# Patient Record
Sex: MALE | Race: WHITE | Employment: FULL TIME | URBAN - METROPOLITAN AREA
[De-identification: names, ages, dates, MRNs, and addresses within clinical notes are randomized per-mention and may not be internally consistent; named-entity substitution may affect disease eponyms.]

---

## 2019-04-03 ENCOUNTER — HOSPITAL ENCOUNTER (EMERGENCY)
Age: 34
Discharge: HOME | End: 2019-04-03
Attending: EMERGENCY MEDICINE
Payer: COMMERCIAL

## 2019-04-03 VITALS
DIASTOLIC BLOOD PRESSURE: 82 MMHG | TEMPERATURE: 98 F | HEART RATE: 68 BPM | SYSTOLIC BLOOD PRESSURE: 114 MMHG | BODY MASS INDEX: 36.37 KG/M2 | WEIGHT: 240 LBS | OXYGEN SATURATION: 100 % | HEIGHT: 68 IN | RESPIRATION RATE: 16 BRPM

## 2019-04-03 DIAGNOSIS — R35.0 URINARY FREQUENCY: Primary | ICD-10-CM

## 2019-04-03 LAB
ALBUMIN SERPL-MCNC: 5.1 G/DL (ref 3.5–4.6)
ALP BLD-CCNC: 80 U/L (ref 35–104)
ALT SERPL-CCNC: 28 U/L (ref 0–41)
ANION GAP SERPL CALCULATED.3IONS-SCNC: 14 MEQ/L (ref 9–15)
AST SERPL-CCNC: 26 U/L (ref 0–40)
BASOPHILS ABSOLUTE: 0 K/UL (ref 0–0.2)
BASOPHILS RELATIVE PERCENT: 0.6 %
BILIRUB SERPL-MCNC: 0.6 MG/DL (ref 0.2–0.7)
BILIRUBIN URINE: NEGATIVE
BLOOD, URINE: NEGATIVE
BUN BLDV-MCNC: 7 MG/DL (ref 6–20)
CALCIUM SERPL-MCNC: 9.3 MG/DL (ref 8.5–9.9)
CHLORIDE BLD-SCNC: 96 MEQ/L (ref 95–107)
CLARITY: CLEAR
CO2: 30 MEQ/L (ref 20–31)
COLOR: YELLOW
CREAT SERPL-MCNC: 0.79 MG/DL (ref 0.7–1.2)
EOSINOPHILS ABSOLUTE: 0.1 K/UL (ref 0–0.7)
EOSINOPHILS RELATIVE PERCENT: 1.5 %
GFR AFRICAN AMERICAN: >60
GFR NON-AFRICAN AMERICAN: >60
GLOBULIN: 2.8 G/DL (ref 2.3–3.5)
GLUCOSE BLD-MCNC: 107 MG/DL (ref 70–99)
GLUCOSE BLD-MCNC: 86 MG/DL (ref 60–115)
GLUCOSE URINE: NEGATIVE MG/DL
HCT VFR BLD CALC: 42.2 % (ref 42–52)
HEMOGLOBIN: 15.4 G/DL (ref 14–18)
KETONES, URINE: NEGATIVE MG/DL
LEUKOCYTE ESTERASE, URINE: NEGATIVE
LYMPHOCYTES ABSOLUTE: 2.2 K/UL (ref 1–4.8)
LYMPHOCYTES RELATIVE PERCENT: 30 %
MCH RBC QN AUTO: 32.2 PG (ref 27–31.3)
MCHC RBC AUTO-ENTMCNC: 36.5 % (ref 33–37)
MCV RBC AUTO: 88.2 FL (ref 80–100)
MONOCYTES ABSOLUTE: 0.5 K/UL (ref 0.2–0.8)
MONOCYTES RELATIVE PERCENT: 6.7 %
NEUTROPHILS ABSOLUTE: 4.5 K/UL (ref 1.4–6.5)
NEUTROPHILS RELATIVE PERCENT: 61.2 %
NITRITE, URINE: NEGATIVE
PDW BLD-RTO: 13.1 % (ref 11.5–14.5)
PERFORMED ON: NORMAL
PH UA: 5.5 (ref 5–9)
PLATELET # BLD: 268 K/UL (ref 130–400)
POTASSIUM SERPL-SCNC: 3.9 MEQ/L (ref 3.4–4.9)
PROTEIN UA: NEGATIVE MG/DL
RBC # BLD: 4.78 M/UL (ref 4.7–6.1)
SODIUM BLD-SCNC: 140 MEQ/L (ref 135–144)
SPECIFIC GRAVITY UA: 1.02 (ref 1–1.03)
TOTAL PROTEIN: 7.9 G/DL (ref 6.3–8)
URINE REFLEX TO CULTURE: NORMAL
UROBILINOGEN, URINE: 0.2 E.U./DL
WBC # BLD: 7.3 K/UL (ref 4.8–10.8)

## 2019-04-03 PROCEDURE — 85025 COMPLETE CBC W/AUTO DIFF WBC: CPT

## 2019-04-03 PROCEDURE — 36415 COLL VENOUS BLD VENIPUNCTURE: CPT

## 2019-04-03 PROCEDURE — 81003 URINALYSIS AUTO W/O SCOPE: CPT

## 2019-04-03 PROCEDURE — 80053 COMPREHEN METABOLIC PANEL: CPT

## 2019-04-03 PROCEDURE — 99283 EMERGENCY DEPT VISIT LOW MDM: CPT

## 2019-04-03 SDOH — HEALTH STABILITY: MENTAL HEALTH: HOW OFTEN DO YOU HAVE A DRINK CONTAINING ALCOHOL?: NEVER

## 2019-04-03 ASSESSMENT — ENCOUNTER SYMPTOMS
WHEEZING: 0
ALLERGIC/IMMUNOLOGIC NEGATIVE: 1
ABDOMINAL PAIN: 0
VOMITING: 0
NAUSEA: 0
EYES NEGATIVE: 1
SHORTNESS OF BREATH: 0

## 2019-04-03 NOTE — PROGRESS NOTES
Patient is alert and oriented X 4. Patient states that for the last two months he has been experienced dizziness with increased frequency. Patient denies nausea, vomiting, diarrhea.  Accu check completed 86

## 2019-04-03 NOTE — ED PROVIDER NOTES
[address] [facility] ED  eMERGENCY dEPARTMENT eNCOUnter      Pt Name: Ricardo Mejía  MRN: [id number]  [place] 1985  Date of evaluation: 4/3/2019  Provider: Narayan Serra MD    CHIEF COMPLAINT       Chief Complaint   Patient presents with    Urinary Frequency     FREQUENT URGE TO URINATE X2 MONTHS DIZZY DENIES ANY N/V/D FEVER OR CHILLS AT THIS TIME         HISTORY OF PRESENT ILLNESS   (Location/Symptom, Timing/Onset,Context/Setting, Quality, Duration, Modifying Factors, Severity)  Note limiting factors. Ricardo Mejía is a 34 y.o. male who presents to the emergency department complaining of urinary frequency. Patient admits that the pregnancy seems to start over last 2 months. Patient notes that perhaps she has occasional flank discomfort. Patient admits that occasionally he has some chills. Patient denies testicular pain or penile discharge or other unusual symptoms. Patient denies nausea vomiting, fevers or chills. Patient admits she is very fearful of a number of potential biologic diseases. Patient admits she is concerned about potential diabetes. Patient admits he went to see his PCP last week, but has not had results of testing or reassurance at this time. HPI    NursingNotes were reviewed. REVIEW OF SYSTEMS    (2-9 systems for level 4, 10 or more for level 5)     Review of Systems   Constitutional: Positive for chills. Negative for activity change and fever. HENT: Negative for congestion. Eyes: Negative. Respiratory: Negative for shortness of breath and wheezing. Cardiovascular: Negative for chest pain and leg swelling. Gastrointestinal: Negative for abdominal pain, nausea and vomiting. Endocrine: Negative. Genitourinary: Negative for dysuria, frequency and hematuria. Musculoskeletal: Negative for gait problem and neck pain. Skin: Negative. Allergic/Immunologic: Negative.     Neurological: Negative for seizures, syncope, speech difficulty and  None   Social History Narrative    None       SCREENINGS             PHYSICAL EXAM    (up to 7 for level 4, 8 or more for level 5)     ED Triage Vitals [04/03/19 0732]   BP Temp Temp Source Pulse Resp SpO2 Height Weight   (!) 149/99 98 °F (36.7 °C) Oral 81 18 100 % 5' 8\" (1.727 m) 240 lb (108.9 kg)       Physical Exam   Constitutional: He is oriented to person, place, and time. He appears well-developed and well-nourished. No distress. HENT:   Head: Normocephalic and atraumatic. Right Ear: External ear normal.   Left Ear: External ear normal.   Eyes: Pupils are equal, round, and reactive to light. Conjunctivae and EOM are normal. Right eye exhibits no discharge. Left eye exhibits no discharge. Neck: Trachea normal, normal range of motion and full passive range of motion without pain. Neck supple. Cardiovascular: Normal rate, regular rhythm, normal heart sounds, intact distal pulses and normal pulses. Exam reveals no gallop and no friction rub. Pulmonary/Chest: Breath sounds normal. No respiratory distress. He has no wheezes. He has no rales. Abdominal: Soft. Normal appearance and bowel sounds are normal. He exhibits no distension and no mass. There is no tenderness. There is no guarding. Musculoskeletal: Normal range of motion. He exhibits no edema, tenderness or deformity. Neurological: He is alert and oriented to person, place, and time. He displays normal reflexes. No cranial nerve deficit. Coordination normal.   Skin: Skin is warm, dry and intact. No rash noted. He is not diaphoretic. No erythema. No pallor. Psychiatric: He has a normal mood and affect. His speech is normal and behavior is normal. Judgment and thought content normal. Cognition and memory are normal.   Nursing note and vitals reviewed.       DIAGNOSTIC RESULTS     EKG: All EKG's are interpreted by the Emergency Department Physician who either signs or Co-signsthis chart in the absence of a cardiologist.    -    RADIOLOGY: Non-plain filmimages such as CT, Ultrasound and MRI are read by the radiologist. Plain radiographic images are visualized and preliminarily interpreted by the emergency physician with the below findings:    -    Interpretation per the Radiologist below, if available at the time ofthis note:    No orders to display         ED BEDSIDE ULTRASOUND:   Performed by ED Physician - none    LABS:  Labs Reviewed   COMPREHENSIVE METABOLIC PANEL - Abnormal; Notable for the following components:       Result Value    Glucose 107 (*)     Alb 5.1 (*)     All other components within normal limits   CBC WITH AUTO DIFFERENTIAL - Abnormal; Notable for the following components:    MCH 32.2 (*)     All other components within normal limits   URINE RT REFLEX TO CULTURE   POCT GLUCOSE       All other labs were within normal range or not returned as of this dictation. EMERGENCY DEPARTMENT COURSE and DIFFERENTIAL DIAGNOSIS/MDM:   Vitals:    Vitals:    04/03/19 0732 04/03/19 0848   BP: (!) 149/99 114/82   Pulse: 81 68   Resp: 18 16   Temp: 98 °F (36.7 °C) 98 °F (36.7 °C)   TempSrc: Oral Oral   SpO2: 100% 100%   Weight: 240 lb (108.9 kg)    Height: 5' 8\" (1.727 m)        Patient was largely unremarkable findings at this time. Patient very reassured. Patient indicates no evidence of decompensation. Advised for consideration of close follow primary care physician and consideration of follow-up with urology. Patient in good agreement with this plan of care. Patient very relieved not to have findings of diabetes or other acute renal pathology. Advised on necessity of close follow-up. Advised on differential diagnoses. Advised that further evaluation workup may be necessary. Patient's prescription understanding and will return to the emergency department should his condition worsen in any capacity. MDM    CRITICAL CARE TIME   Total Critical Care time was - minutes, excluding separately reportableprocedures.   There was a high probability of clinicallysignificant/life threatening deterioration in the patient's condition which required my urgent intervention.  -    CONSULTS:  None    PROCEDURES:  Unless otherwise noted below, none     Procedures    FINAL IMPRESSION      1. Urinary frequency        DISPOSITION/PLAN   DISPOSITION Decision To Discharge 04/03/2019 08:46:34 AM      PATIENT REFERRED TO:  Izzy Dupont DO  [address] [address]  [phone]    Call today  for follow up Emergency Department visit    Michael Colindres MD  [address]   [facility] [id number] [phone]    Call today  for follow up Emergency Department visit      DISCHARGE MEDICATIONS:  Discharge Medication List as of 4/3/2019  8:51 AM             (Please note that portions of this note were completed with a voice recognitionprogram.  Efforts were made to edit the dictations but occasionally words are mis-transcribed.)    Maday Webster MD (electronically signed)  Attending Emergency Physician          Maday Webster MD  04/03/19 5906

## 2019-04-09 ENCOUNTER — OFFICE VISIT (OUTPATIENT)
Dept: UROLOGY | Age: 34
End: 2019-04-09
Payer: COMMERCIAL

## 2019-04-09 VITALS
WEIGHT: 235 LBS | HEIGHT: 68 IN | DIASTOLIC BLOOD PRESSURE: 80 MMHG | SYSTOLIC BLOOD PRESSURE: 132 MMHG | HEART RATE: 79 BPM | BODY MASS INDEX: 35.61 KG/M2

## 2019-04-09 DIAGNOSIS — R35.0 FREQUENCY OF URINATION: Primary | ICD-10-CM

## 2019-04-09 LAB
BILIRUBIN, POC: NORMAL
BLOOD URINE, POC: NORMAL
CLARITY, POC: CLEAR
COLOR, POC: YELLOW
GLUCOSE URINE, POC: NORMAL
KETONES, POC: NORMAL
LEUKOCYTE EST, POC: NORMAL
NITRITE, POC: NORMAL
PH, POC: 7
PROTEIN, POC: NORMAL
SPECIFIC GRAVITY, POC: 1.01
UROBILINOGEN, POC: 0.2

## 2019-04-09 PROCEDURE — 51798 US URINE CAPACITY MEASURE: CPT | Performed by: UROLOGY

## 2019-04-09 PROCEDURE — 99203 OFFICE O/P NEW LOW 30 MIN: CPT | Performed by: UROLOGY

## 2019-04-09 PROCEDURE — 81003 URINALYSIS AUTO W/O SCOPE: CPT | Performed by: UROLOGY

## 2019-04-09 PROCEDURE — 51741 ELECTRO-UROFLOWMETRY FIRST: CPT | Performed by: UROLOGY

## 2019-04-09 ASSESSMENT — ENCOUNTER SYMPTOMS
ABDOMINAL PAIN: 0
RESPIRATORY NEGATIVE: 1
NAUSEA: 0
SHORTNESS OF BREATH: 0
ABDOMINAL DISTENTION: 0
ALLERGIC/IMMUNOLOGIC NEGATIVE: 1
DIARRHEA: 0
BACK PAIN: 1
EYES NEGATIVE: 1

## 2019-04-09 NOTE — PROGRESS NOTES
Subjective:      Patient ID: Jodee Montgomery is a 34 y.o. male. HPI This is a 35 yo male with prior h/o opiate addiction on long term Suboxone and sent from the ED for evaluation of frequency. He feels the frequency started with the use of Suboxone a few years ago and has recently progressed. He has DF < 8 and NF 2-3. He does report that he cut back on the Suboxone and this has reduced the frequency significantly. He denies urgency or UI. He has no abd or flank pain. He has no dysuria or hematuria. He has no UTI's or kidney stones. He has no abd pain or flank pain. He does get some lumbar back pain with activity and had recent plain films by his PCP at the [facility] that suggests DDD. He has no numbness or tingling. He has a good flow and feels the bladder empties. He has no PVD or intermittency. He has no penile discharge or STD's. He has no splaying of the stream. He has not had a prior catheter. He has no prior  surgical history. He mendez snot drink alcohol or excessive caffeine. He has no F/C or N/V. He has no family h/o  malignancies. He has no other complaints. History reviewed. No pertinent past medical history. History reviewed. No pertinent surgical history.   Social History     Socioeconomic History    Marital status: Single     Spouse name: None    Number of children: None    Years of education: None    Highest education level: None   Occupational History    None   Social Needs    Financial resource strain: None    Food insecurity:     Worry: None     Inability: None    Transportation needs:     Medical: None     Non-medical: None   Tobacco Use    Smoking status: Former Smoker    Smokeless tobacco: Never Used   Substance and Sexual Activity    Alcohol use: Not Currently     Frequency: Never    Drug use: Not Currently    Sexual activity: None   Lifestyle    Physical activity:     Days per week: None     Minutes per session: None    Stress: None   Relationships    Social connections: breath sounds normal. No stridor. No respiratory distress. Abdominal: Soft. Bowel sounds are normal. He exhibits no distension and no mass. There is no tenderness. There is no rebound, no guarding and no CVA tenderness. No hernia. Hernia confirmed negative in the right inguinal area and confirmed negative in the left inguinal area. Genitourinary: Penis normal. Right testis shows no mass, no swelling and no tenderness. Right testis is descended. Left testis shows no mass, no swelling and no tenderness. Left testis is descended. Circumcised. No phimosis, paraphimosis, hypospadias, penile erythema or penile tenderness. No discharge found. 4/9/2019 10:47 AM - Yennifer Ford CMA ([facility]AMA)     Component Collected Lab   Color,  04/09/2019 10:46 AM Unknown   yellow    Clarity, UA 04/09/2019 10:46 AM Unknown   clear    Glucose,  POC 04/09/2019 10:46 AM Unknown   neg    Bilirubin,  04/09/2019 10:46 AM Unknown   neg    Ketones,  04/09/2019 10:46 AM Unknown   neg    Spec Grav,  04/09/2019 10:46 AM Unknown   1.015    Blood,  POC 04/09/2019 10:46 AM Unknown   neg    pH,  04/09/2019 10:46 AM Unknown   7.0    Protein,  POC 04/09/2019 10:46 AM Unknown   neg    Urobilinogen, UA 04/09/2019 10:46 AM Unknown   0.2    Leukocytes, UA 04/09/2019 10:46 AM Unknown   neg    Nitrite,  04/09/2019 10:46 AM Unknown   neg      4/3/2019  8:14 AM - Juanis Diaz Incoming Lab Results From Soft     Component Value Ref Range & Units Status Collected Lab   WBC 7.3  4.8 - 10.8 K/uL Final 04/03/2019  8:00 AM [facility]   RBC 4.78  4.70 - 6.10 M/uL Final 04/03/2019  8:00 AM [facility]   Hemoglobin 15.4  14.0 - 18.0 g/dL Final 04/03/2019  8:00 AM [facility]   Hematocrit 42.2  42.0 - 52.0 % Final 04/03/2019  8:00 AM [facility]   MCV 88.2  80.0 - 100.0 fL Final 04/03/2019  8:00 AM [facility]   [facility] 32. 2High   27.0 - 31.3 pg Final 04/03/2019  8:00 AM [facility] - [place] [address]   70 - 99 mg/dL Final 04/03/2019  8:00 AM [address] [facility]   Effective:  2/7/2019   New reference range for this analyte has been established. BUN 7  6 - 20 mg/dL Final 04/03/2019  8:00 AM [facility]   CREATININE 0.79  0.70 - 1.20 mg/dL Final 04/03/2019  8:00 AM [facility]   GFR Non- >60.0  >60 Final 04/03/2019  8:00 AM [facility]   >60 mL/min/1.73m2 EGFR, calc. for ages 25 and older using the   MDRD formula (not corrected for weight), is valid for stable   renal function. GFR  >60.0  >60 Final 04/03/2019  8:00 AM [facility]   >60 mL/min/1.73m2 EGFR, calc. for ages 25 and older using the   MDRD formula (not corrected for weight), is valid for stable   renal function. Uroflow: 17 ml/sec, vol 142cc  post void residual by U/S: 64 cc    Assessment: This is a 35 yo male with prior h/o opiate addiction on long term Suboxone and with urinary frequency that has improved. He mainly has DF and no other worrisome symptoms. The U/A is normal and he has a good flow and bladder emptying by PVR today. I reassured the pt of these findings and recommend no further  evaluation. Discussed dietary irritants to the bladder. If symptoms worsen could consider cystoscopy and Ct in the future. Plan:      1. F/U prn  2. See PCP about Suboxone and DDD  3.  Dietary irritant info given        Dyan Carvajal MD

## 2025-08-09 ENCOUNTER — OFFICE VISIT (OUTPATIENT)
Dept: URGENT CARE | Age: 40
End: 2025-08-09
Payer: COMMERCIAL

## 2025-08-09 VITALS
OXYGEN SATURATION: 99 % | DIASTOLIC BLOOD PRESSURE: 93 MMHG | TEMPERATURE: 98 F | HEART RATE: 89 BPM | RESPIRATION RATE: 19 BRPM | SYSTOLIC BLOOD PRESSURE: 144 MMHG

## 2025-08-09 DIAGNOSIS — L23.7 CONTACT DERMATITIS DUE TO RHUS TOXICODENDRON: Primary | ICD-10-CM

## 2025-08-09 RX ORDER — METHYLPREDNISOLONE SODIUM SUCCINATE 125 MG/2ML
125 INJECTION INTRAMUSCULAR; INTRAVENOUS ONCE
Status: COMPLETED | OUTPATIENT
Start: 2025-08-09 | End: 2025-08-09

## 2025-08-09 RX ORDER — PREDNISONE 20 MG/1
20 TABLET ORAL 2 TIMES DAILY
Qty: 14 TABLET | Refills: 0 | Status: SHIPPED | OUTPATIENT
Start: 2025-08-09 | End: 2025-08-16

## 2025-08-09 RX ORDER — FAMOTIDINE 20 MG/1
20 TABLET, FILM COATED ORAL 2 TIMES DAILY
Qty: 14 TABLET | Refills: 0 | Status: SHIPPED | OUTPATIENT
Start: 2025-08-09 | End: 2025-08-16

## 2025-08-09 RX ADMIN — METHYLPREDNISOLONE SODIUM SUCCINATE 125 MG: 125 INJECTION INTRAMUSCULAR; INTRAVENOUS at 12:23

## 2025-08-09 NOTE — PROGRESS NOTES
Subjective   Patient ID: Bryon Evans is a 39 y.o. male who presents for Poison Ivy (X 1wk, worse on arms, spreading, itchy ).  HPI  Patient presents for skin irritation.  Patient first noticed a small patch of pruritic, erythematous vesicular eruptions on the (left forearm) several days ago.  Patient reports the rash is spread to the (left upper arm).  No reported attempted conservative management.  No other similar areas of concern.  No other complaints.    Review of Systems    Constitutional:  See HPI    Integumentary: See HPI  Neurologic:  Alert and oriented X4, No numbness, No tingling.    All other systems are negative     Objective     BP (!) 144/93   Pulse 89   Temp 36.7 °C (98 °F)   Resp 19   SpO2 99%     Physical Exam    General:  Alert and oriented, No acute distress.    Eye:  Pupils are equal, round and reactive to light, Normal conjunctiva.    HENT:  Normocephalic,   Neck:  Supple    Respiratory: Respirations are non-labored   Musculoskeletal: Normal ROM and strength  Integumentary: Left upper arm and left forearm with 2 small, erythematous, blanchable, pruritic patches with vesicles  Neurologic:  Alert, Oriented, Normal sensory, Cranial Nerves II-XII are grossly intact  Psychiatric:  Cooperative, Appropriate mood & affect.    Assessment/Plan   Exam is consistent with contact dermatitis.  Patient requested injection so Solu-Medrol was provided.  Prescription for prednisone and Pepcid otherwise.  Patient's clinical presentation is otherwise unremarkable at this time. Patient is discharged with instructions to follow-up with primary care or seek emergency medical attention for worsening symptoms or any new concerns.  Problem List Items Addressed This Visit    None      Final diagnoses:   None